# Patient Record
Sex: FEMALE | Race: WHITE | URBAN - METROPOLITAN AREA
[De-identification: names, ages, dates, MRNs, and addresses within clinical notes are randomized per-mention and may not be internally consistent; named-entity substitution may affect disease eponyms.]

---

## 2018-05-23 ENCOUNTER — APPOINTMENT (RX ONLY)
Dept: URBAN - METROPOLITAN AREA CLINIC 164 | Facility: CLINIC | Age: 78
Setting detail: DERMATOLOGY
End: 2018-05-23

## 2018-05-23 DIAGNOSIS — L82.1 OTHER SEBORRHEIC KERATOSIS: ICD-10-CM

## 2018-05-23 DIAGNOSIS — L57.0 ACTINIC KERATOSIS: ICD-10-CM

## 2018-05-23 DIAGNOSIS — D22 MELANOCYTIC NEVI: ICD-10-CM

## 2018-05-23 PROBLEM — E13.9 OTHER SPECIFIED DIABETES MELLITUS WITHOUT COMPLICATIONS: Status: ACTIVE | Noted: 2018-05-23

## 2018-05-23 PROBLEM — R23.3 SPONTANEOUS ECCHYMOSES: Status: ACTIVE | Noted: 2018-05-23

## 2018-05-23 PROBLEM — D22.5 MELANOCYTIC NEVI OF TRUNK: Status: ACTIVE | Noted: 2018-05-23

## 2018-05-23 PROBLEM — I10 ESSENTIAL (PRIMARY) HYPERTENSION: Status: ACTIVE | Noted: 2018-05-23

## 2018-05-23 PROBLEM — D48.5 NEOPLASM OF UNCERTAIN BEHAVIOR OF SKIN: Status: ACTIVE | Noted: 2018-05-23

## 2018-05-23 PROBLEM — Z85.828 PERSONAL HISTORY OF OTHER MALIGNANT NEOPLASM OF SKIN: Status: ACTIVE | Noted: 2018-05-23

## 2018-05-23 PROBLEM — I63.50 CEREBRAL INFARCTION DUE TO UNSPECIFIED OCCLUSION OR STENOSIS OF UNSPECIFIED CEREBRAL ARTERY: Status: ACTIVE | Noted: 2018-05-23

## 2018-05-23 PROBLEM — E78.5 HYPERLIPIDEMIA, UNSPECIFIED: Status: ACTIVE | Noted: 2018-05-23

## 2018-05-23 PROCEDURE — ? LIQUID NITROGEN

## 2018-05-23 PROCEDURE — ? SHAVE REMOVAL

## 2018-05-23 PROCEDURE — 11312 SHAVE SKIN LESION 1.1-2.0 CM: CPT | Mod: 59

## 2018-05-23 PROCEDURE — ? COUNSELING

## 2018-05-23 PROCEDURE — 17003 DESTRUCT PREMALG LES 2-14: CPT

## 2018-05-23 PROCEDURE — 17000 DESTRUCT PREMALG LESION: CPT

## 2018-05-23 PROCEDURE — 99203 OFFICE O/P NEW LOW 30 MIN: CPT | Mod: 25

## 2018-05-23 ASSESSMENT — LOCATION DETAILED DESCRIPTION DERM
LOCATION DETAILED: RIGHT SUPERIOR FOREHEAD
LOCATION DETAILED: RIGHT LATERAL ZYGOMA
LOCATION DETAILED: LEFT SUPERIOR MEDIAL UPPER BACK
LOCATION DETAILED: LEFT SUPERIOR CENTRAL MALAR CHEEK
LOCATION DETAILED: RIGHT FOREHEAD
LOCATION DETAILED: RIGHT SUPERIOR LATERAL MALAR CHEEK
LOCATION DETAILED: NASAL DORSUM
LOCATION DETAILED: LEFT PROXIMAL PRETIBIAL REGION
LOCATION DETAILED: NASAL TIP

## 2018-05-23 ASSESSMENT — LOCATION SIMPLE DESCRIPTION DERM
LOCATION SIMPLE: LEFT UPPER BACK
LOCATION SIMPLE: RIGHT CHEEK
LOCATION SIMPLE: LEFT PRETIBIAL REGION
LOCATION SIMPLE: RIGHT ZYGOMA
LOCATION SIMPLE: LEFT CHEEK
LOCATION SIMPLE: RIGHT FOREHEAD
LOCATION SIMPLE: NOSE

## 2018-05-23 ASSESSMENT — LOCATION ZONE DERM
LOCATION ZONE: LEG
LOCATION ZONE: FACE
LOCATION ZONE: TRUNK
LOCATION ZONE: NOSE

## 2018-05-23 NOTE — PROCEDURE: SHAVE REMOVAL
Was A Bandage Applied: Yes
Bill For Surgical Tray: no
Biopsy Method: Dermablade
Billing Type: Third-Party Bill
Size Of Margin In Cm (Margins Are Not Added To Billing Dimensions): -
Medical Necessity Information: It is in your best interest to select a reason for this procedure from the list below. All of these items fulfill various CMS LCD requirements except the new and changing color options.
X Size Of Lesion In Cm (Optional): 0
Post-Care Instructions: I reviewed with the patient in detail post-care instructions. Patient is to keep the biopsy site dry overnight, and then apply bacitracin twice daily until healed. Patient may apply hydrogen peroxide soaks to remove any crusting.
Size Of Lesion In Cm (Required): 1.1
Notification Instructions: Patient will be notified of biopsy results. However, patient instructed to call the office if not contacted within 2 weeks.
Anesthesia Type: 2% lidocaine with epinephrine
Medical Necessity Clause: This procedure was medically necessary because the lesion that was treated was: irritated from grooming & enlarging
Hemostasis: Jed's
Consent was obtained from the patient. The risks and benefits to therapy were discussed in detail. Specifically, the risks of infection, scarring, bleeding, prolonged wound healing, incomplete removal, allergy to anesthesia, nerve injury and recurrence were addressed. Prior to the procedure, the treatment site was clearly identified and confirmed by the patient. All components of Universal Protocol/PAUSE Rule completed.
Path Notes (To The Dermatopathologist): Please check margins.
Detail Level: Detailed
Wound Care: Petrolatum

## 2018-05-23 NOTE — PROCEDURE: LIQUID NITROGEN
Number Of Freeze-Thaw Cycles: 1 freeze-thaw cycle
Consent: The patient's consent was obtained including but not limited to risks of crusting, scabbing, blistering, scarring, darker or lighter pigmentary change, recurrence, incomplete removal and infection.
Render Post-Care Instructions In Note?: no
Detail Level: Detailed
Duration Of Freeze Thaw-Cycle (Seconds): 4
Post-Care Instructions: I reviewed with the patient in detail post-care instructions. Patient is to wear sunprotection, and avoid picking at any of the treated lesions. Pt may apply Vaseline to crusted or scabbing areas.

## 2018-07-12 ENCOUNTER — APPOINTMENT (RX ONLY)
Dept: URBAN - METROPOLITAN AREA CLINIC 164 | Facility: CLINIC | Age: 78
Setting detail: DERMATOLOGY
End: 2018-07-12

## 2018-07-12 PROBLEM — C44.311 BASAL CELL CARCINOMA OF SKIN OF NOSE: Status: ACTIVE | Noted: 2018-07-12

## 2018-07-12 PROCEDURE — 17312 MOHS ADDL STAGE: CPT

## 2018-07-12 PROCEDURE — 17311 MOHS 1 STAGE H/N/HF/G: CPT

## 2018-07-12 PROCEDURE — ? MOHS SURGERY

## 2018-07-12 PROCEDURE — ? PRESCRIPTION

## 2018-07-12 RX ORDER — DOXYCYCLINE 100 MG/1
TABLET, FILM COATED ORAL QD
Qty: 7 | Refills: 0 | Status: ERX | COMMUNITY
Start: 2018-07-12

## 2018-07-12 RX ADMIN — DOXYCYCLINE: 100 TABLET, FILM COATED ORAL at 13:40

## 2018-07-12 NOTE — PROCEDURE: MOHS SURGERY

## 2018-07-20 ENCOUNTER — APPOINTMENT (RX ONLY)
Dept: URBAN - METROPOLITAN AREA CLINIC 57 | Facility: CLINIC | Age: 78
Setting detail: DERMATOLOGY
End: 2018-07-20

## 2018-07-20 PROBLEM — C44.311 BASAL CELL CARCINOMA OF SKIN OF NOSE: Status: ACTIVE | Noted: 2018-07-20

## 2018-07-20 PROCEDURE — ? REPAIR NOTE

## 2018-07-20 PROCEDURE — 14060 TIS TRNFR E/N/E/L 10 SQ CM/<: CPT

## 2022-04-23 NOTE — PROCEDURE: MOHS SURGERY
Hospital Medicine Initial Consultation    PCP: Grady Busby DO    Date of Admission: 4/23/2022    Date of Service: 4/23/2022    Pt seen/examined on 4/23/2022    Consulting Physician: Dr. Samia Sauceda: Neurosurgery    Chief Complaint:  Low back pain    History Of Present Illness:      82 y.o. female who presented to [facility], [facility] with chronic low back pain a/w tingling that is persistent despite conservative treatment. We are consulted for medical management after the following procedure:  L1-2, L2-3 EXTREME LATERAL INTERBODY FUSION, T10-PELVIS DECOMPRESSION, FIXATION AND FUSION, ILIAC WING SCREWS with Dr. Rachel Butler    Seen today in bed. Was too tired after surgery yesterday to work w/ therapy but today has been up to the chair for a couple hours. Feeling better today. Has h/o pSVT, today had coffee w/ caffeine and briefly had HR up to 170s. This resolved now that she has taken her rate controlling medication.     Past Medical History:          Diagnosis Date    Anxiety     Chronic kidney disease, stage 3b (HCC)     Chronic midline low back pain without sciatica     Coronary artery disease involving native coronary artery without angina pectoris     Essential hypertension     Foot drop, left     GERD without esophagitis     History of blood transfusion     Internal hemorrhoids     Intrinsic eczema     Lesion of lateral popliteal nerve     Osteoporosis of multiple sites     Paroxysmal supraventricular tachycardia (HCC)     Primary osteoarthritis involving multiple joints     Prolonged emergence from general anesthesia     PUD (peptic ulcer disease)     Restless leg syndrome        Past Surgical History:          Procedure Laterality Date    ABDOMINAL EXPLORATION SURGERY  2000    Lysis of Adhesions    BREAST SURGERY Right     Biopsy    CARPAL TUNNEL RELEASE Right 11/03/2020    RIGHT CARPAL TUNNEL RELEASE performed by Brant Bustamante MD at [address] REMOVAL WITH IMPLANT Right 08/24/2021    CATARACT REMOVAL WITH IMPLANT Left 09/07/2021    Dr. Yanira Lyons      from back    EYE SURGERY      HEMORRHOID SURGERY  02/24/2017    HYSTERECTOMY      Complete    INTRACAPSULAR CATARACT EXTRACTION Right 08/24/2021    PHACOEMULSIFICATION WITH INTRAOCULAR LENS IMPLANT RIGHT EYE, performed by Heriberto Cunnignham MD at [facility] EXTRACTION Left 09/07/2021    PHACOEMULSIFICATION WITH INTRAOCULAR LENS IMPLANT - LEFT EYE performed by Heriberto Cunningham MD at [address]      x 3 with fusion    LUMBAR SPINE SURGERY  04/21/2022    Discectomy with cage at 2 levels / Dr. Alexandra Alvarez Bilateral 12/08/2021    BILATERAL L4 TRANSFORMANIAL EPIDURAL STEROID INJECTION WITH FLUOROSCOPY performed by Jw Solares MD at [facility] ARTHROSCOPY Right     Right shoulder scope 2) Right shoulder labral debridement 3) Right shoulder Open Miguel 4) Right shoulder rotator cuff repair    TOTAL HIP ARTHROPLASTY Bilateral     TOTAL KNEE ARTHROPLASTY Left 02/03/2015    Dr. Batsheva Epstein UPPER GASTROINTESTINAL ENDOSCOPY N/A 03/10/2020    EGD ESOPHAGOGASTRODUODENOSCOPY performed by Francoise Otero MD at [address]         Medications Prior to Admission:      Prior to Admission medications    Medication Sig Start Date End Date Taking? Authorizing Provider   LORazepam (ATIVAN) 1 MG tablet Take 1 mg by mouth at bedtime. Yes Historical Provider, MD   HYDROcodone-acetaminophen (NORCO) 7.5-325 MG per tablet Take 1 tablet by mouth every 8 hours as needed for Pain for up to 30 days.  4/11/22 5/11/22  Deven Donaldson DO   alendronate (FOSAMAX) 70 MG tablet Take 1 tablet by mouth every 7 days 3/30/22   Deven Donaldson DO   Calcium Carbonate-Vitamin D (CALTRATE 600+D PO) Take 1 capsule by mouth in the morning, at noon, and at bedtime    Historical Provider, MD   omeprazole (PRILOSEC) 20 MG delayed release capsule TAKE 1 CAPSULE EVERY DAY 3/14/22   Angelica Irizarry DO   triamterene-hydroCHLOROthiazide (MAXZIDE-25) 37.5-25 MG per tablet TAKE 1 TABLET EVERY DAY 3/13/22   Angelica Irizarry DO   metoprolol tartrate (LOPRESSOR) 50 MG tablet TAKE 1 TABLET TWICE DAILY 3/13/22   Angelica Irizarry DO   verapamil (CALAN SR) 120 MG extended release tablet TAKE 1 TABLET EVERY DAY 3/13/22   Angelica Irizarry DO   meloxicam (MOBIC) 15 MG tablet TAKE 1 TABLET EVERY DAY 3/13/22   Angelica Irizarry,    vitamin E 1000 units capsule Take 180 Units by mouth daily     Historical Provider, MD   timolol (BETIMOL) 0.5 % ophthalmic solution 1 drop 2 times daily    Historical Provider, MD   LUMIGAN 0.01 % SOLN ophthalmic drops Place 1 drop into both eyes  3/30/17   Historical Provider, MD   docusate sodium (COLACE) 100 MG capsule Take 1 capsule by mouth 2 times daily 2/24/17   Bessy Gunn MD       Allergies:  Latex and Bactrim    Social History:      TOBACCO:   reports that she quit smoking about 25 years ago. Her smoking use included cigarettes. She smoked 1.00 pack per day. She has never used smokeless tobacco.  ETOH:   reports current alcohol use of about 2.0 standard drinks of alcohol per week. Family History:      Reviewed in detail and negative except as follows:        Problem Relation Age of Onset    High Blood Pressure Mother     Stroke Father     High Blood Pressure Father     Depression Daughter     Cancer Son         Colorectal     Depression Son     Diabetes Maternal Aunt     Cancer Maternal Uncle         Throat    Alcohol Abuse Maternal Uncle     Asthma Maternal Uncle     Alcohol Abuse Maternal Uncle        REVIEW OF SYSTEMS:   Pertinent positives and negatives as noted in the HPI. All other systems reviewed and negative.     PHYSICAL EXAM PERFORMED:    BP (!) 142/64   Pulse 103 Temp 97.8 °F (36.6 °C) (Temporal)   Resp 17   Ht 5' 4\" (1.626 m)   Wt 196 lb 10.4 oz (89.2 kg)   SpO2 95%   BMI 33.76 kg/m²     General appearance:  No apparent distress, appears stated age  Eyes: Pupils equal, round, reactive to light, conjunctiva/corneas clear  Ears/Nose/Mouth/Throat: No external lesions or scars, hearing intact to voice  Neck: Trachea midline, no masses noted, no thyromegaly  Respiratory:  Normal respiratory effort. Clear to auscultation bilaterally  Cardiovascular: Regular rate and rhythm, nl S1/S2, w/o murmurs, rubs or gallops, no lower extremity edema  Abdomen: Soft, non-tender, non-distended, no hepatosplenomegaly  Musculoskeletal: No cyanosis, clubbing or petechiae, no lower extremity misalignment, asymmetry, or crepitation  Skin: Normal skin color, texture, turgor. No rashes or lesions noted. Psychiatric: Alert and oriented x4, good insight and judgment    Labs:     Recent Labs     04/22/22  0345 04/22/22  1904 04/23/22  0537   WBC 12.3*  --  10.5   HGB 8.5* 7.6* 9.7*   HCT 25.5* 23.2* 28.4*   *  --  106*     Recent Labs     04/22/22  0100 04/23/22  0536    138   K 3.9 3.5    103   CO2 22 22   BUN 19 19   CREATININE 1.1 1.2   CALCIUM 8.4 8.7     No results for input(s): AST, ALT, BILIDIR, BILITOT, ALKPHOS in the last 72 hours. No results for input(s): INR in the last 72 hours. No results for input(s): Towana Ball in the last 72 hours. Urinalysis:      Lab Results   Component Value Date    NITRU Negative 02/06/2015    BLOODU neg 10/25/2021    BLOODU Negative 02/06/2015    SPECGRAV 1.020 10/25/2021    SPECGRAV 1.015 02/06/2015    GLUCOSEU neg 10/25/2021    GLUCOSEU Negative 02/06/2015       Radiology:    CT LUMBAR SPINE WO CONTRAST   Final Result   1. Postop fusion T11-T12 cement and transpedicular screws with posterior rods and bony posterior fusion satisfactory position and alignment   2. Intact hardware extending into the S1 and sacral wings.    3. Facet spurring and right foraminal stenosis at L5-S1           CT GUIDED STEREOTACTIC LOCALIZATION   Final Result   1. No intra-abdominal or pelvic abnormalities   2. Expected postoperative retroperitoneal and subcutaneous air   3. T10-S1 and bilateral iliac transpedicular screws with posterior rods in satisfactory alignment and positioning of intermittent vertebral spacers   4. L5 left transpedicular screw resides slightly lateral to the vertebral body cortex into a surgery the left L4 screw. CT GUIDED STEREOTACTIC LOCALIZATION   Final Result   1. No intra-abdominal or pelvic abnormalities   2. Expected postoperative retroperitoneal and subcutaneous air   3. T10-S1 and bilateral iliac transpedicular screws with posterior rods in satisfactory alignment and positioning of intermittent vertebral spacers   4. L5 left transpedicular screw resides slightly lateral to the vertebral body cortex into a surgery the left L4 screw. CT GUIDED STEREOTACTIC LOCALIZATION   Final Result   1. No intra-abdominal or pelvic abnormalities   2. Expected postoperative retroperitoneal and subcutaneous air   3. T10-S1 and bilateral iliac transpedicular screws with posterior rods in satisfactory alignment and positioning of intermittent vertebral spacers   4. L5 left transpedicular screw resides slightly lateral to the vertebral body cortex into a surgery the left L4 screw. XR LUMBAR SPINE (2-3 VIEWS)   Final Result      2 coned-down frontal and lateral views demonstrate fusion rods and lateral mass screws with radiolucent interbody spacers. Fluoroscopy time 2 minutes 15 seconds      FLUORO FOR SURGICAL PROCEDURES   Final Result      2 coned-down frontal and lateral views demonstrate fusion rods and lateral mass screws with radiolucent interbody spacers.       Fluoroscopy time 2 minutes 15 seconds          ASSESSMENT:    Active Hospital Problems    Diagnosis Date Noted    Scoliosis of lumbar region due to degenerative disease of spine in adult [M41.86] 04/21/2022     Priority: Medium       PLAN:    # L1-2, L2-3 EXTREME LATERAL INTERBODY FUSION, T10-PELVIS DECOMPRESSION, FIXATION AND FUSION, ILIAC WING SCREWS with Dr. Leonardo Cardona  -management as per primary    # paroxysmal SVT  Single episode today, due to caffeine, resolved avoid caffeine  -continue home rate control medications    # HTN  On home BP medications    # GERD  # CAD  # HTN  # CKD3  # Other chronic conditions  -continue home management    DVT Prophylaxis: per primary  Diet: ADULT DIET; Regular  Code Status: Full Code    PT/OT Eval Status: per primary    Dispo - per primary     Werner Terry MD    Thank you Dr. Leonardo Cardona for the opportunity to be involved in this patient's care.  If you have any questions or concerns please feel free to contact me at [phone] Manual Repair Warning Statement: We plan on removing the manually selected variable below in favor of our much easier automatic structured text blocks found in the previous tab. We decided to do this to help make the flow better and give you the full power of structured data. Manual selection is never going to be ideal in our platform and I would encourage you to avoid using manual selection from this point on, especially since I will be sunsetting this feature. It is important that you do one of two things with the customized text below. First, you can save all of the text in a word file so you can have it for future reference. Second, transfer the text to the appropriate area in the Library tab. Lastly, if there is a flap or graft type which we do not have you need to let us know right away so I can add it in before the variable is hidden. No need to panic, we plan to give you roughly 6 months to make the change.

## 2023-03-29 NOTE — PROCEDURE: REPAIR NOTE
Sheath prepped and inserted in the left femoral vein. Paramedian Forehead Flap Text: A decision was made to reconstruct the defect utilizing an interpolation axial flap and a staged reconstruction.  A telfa template was made of the defect.  This telfa template was then used to outline the paramedian forehead pedicle flap.  The donor area for the pedicle flap was then injected with anesthesia.  The flap was excised through the skin and subcutaneous tissue down to the layer of the underlying musculature.  The pedicle flap was carefully excised within this deep plane to maintain its blood supply.  The edges of the donor site were undermined.   The donor site was closed in a primary fashion.  The pedicle was then rotated into position and sutured.  Once the tube was sutured into place, adequate blood supply was confirmed with blanching and refill.  The pedicle was then wrapped with xeroform gauze and dressed appropriately with a telfa and gauze bandage to ensure continued blood supply and protect the attached pedicle.

## 2023-07-31 NOTE — PROCEDURE: REPAIR NOTE
+ paraspinal low back pain   neuronitact   Physical Examination:   Gen: NAD, AOx3  Head: NCAT  HEENT: EOMI, oral mucosa moist, normal conjunctiva, neck supple  Lung: no respiratory distress  CV:  Normal perfusion  Abd: soft, NT ND  MSK: No edema, no visible deformities  Neuro: No focal neurologic deficits  Skin: No rash   Psych: normal affect Estimated Blood Loss (Cc): minimal